# Patient Record
Sex: FEMALE | Race: BLACK OR AFRICAN AMERICAN | NOT HISPANIC OR LATINO | Employment: UNEMPLOYED | URBAN - METROPOLITAN AREA
[De-identification: names, ages, dates, MRNs, and addresses within clinical notes are randomized per-mention and may not be internally consistent; named-entity substitution may affect disease eponyms.]

---

## 2023-02-09 ENCOUNTER — TELEPHONE (OUTPATIENT)
Dept: NEUROLOGY | Facility: CLINIC | Age: 28
End: 2023-02-09
Payer: MEDICAID

## 2023-02-09 DIAGNOSIS — F44.5 PSEUDOSEIZURES: ICD-10-CM

## 2023-02-09 DIAGNOSIS — R56.9 CONVULSIONS, UNSPECIFIED CONVULSION TYPE: ICD-10-CM

## 2023-02-09 DIAGNOSIS — R56.9 SEIZURES: Primary | ICD-10-CM

## 2023-02-09 NOTE — TELEPHONE ENCOUNTER
EMU scheduled 3/2/23, 1pm. Prefers Thursday and the later admit time since it takes 3 hours to get here. Aware she is required to have an adult accompany her for the duration including overnight. Aware she will be connected to lines to her head, chest, arm, and have an IV placed; will not be able to leave the room for the duration. Patient discloses she is a smoker and is aware there is absolutely no smoking, vaping, or chewing nicotine gum. She knows she can request a nicotine patch. Instructions thoroughly discussed, mailed via [employer] and sent in portal.

## 2023-03-02 ENCOUNTER — HOSPITAL ENCOUNTER (INPATIENT)
Facility: HOSPITAL | Age: 28
LOS: 2 days | Discharge: HOME | End: 2023-03-05
Attending: PSYCHIATRY & NEUROLOGY | Admitting: PSYCHIATRY & NEUROLOGY
Payer: MEDICAID

## 2023-03-02 DIAGNOSIS — F44.5 PSEUDOSEIZURES: ICD-10-CM

## 2023-03-02 DIAGNOSIS — F44.5 PSYCHOGENIC NONEPILEPTIC SEIZURE: Primary | ICD-10-CM

## 2023-03-02 DIAGNOSIS — R56.9 SEIZURES: ICD-10-CM

## 2023-03-02 DIAGNOSIS — R56.9 SEIZURE: ICD-10-CM

## 2023-03-02 DIAGNOSIS — R56.9 CONVULSIONS, UNSPECIFIED CONVULSION TYPE: ICD-10-CM

## 2023-03-02 PROBLEM — F41.9 ANXIETY: Status: ACTIVE | Noted: 2023-03-02

## 2023-03-02 LAB
ALBUMIN SERPL BCP-MCNC: 3.2 G/DL (ref 3.5–5.2)
ALP SERPL-CCNC: 78 U/L (ref 55–135)
ALT SERPL W/O P-5'-P-CCNC: 16 U/L (ref 10–44)
ANION GAP SERPL CALC-SCNC: 6 MMOL/L (ref 8–16)
AST SERPL-CCNC: 18 U/L (ref 10–40)
BASOPHILS # BLD AUTO: 0.02 K/UL (ref 0–0.2)
BASOPHILS NFR BLD: 0.2 % (ref 0–1.9)
BILIRUB SERPL-MCNC: 0.3 MG/DL (ref 0.1–1)
BUN SERPL-MCNC: 8 MG/DL (ref 6–20)
CALCIUM SERPL-MCNC: 9.2 MG/DL (ref 8.7–10.5)
CHLORIDE SERPL-SCNC: 108 MMOL/L (ref 95–110)
CO2 SERPL-SCNC: 25 MMOL/L (ref 23–29)
CREAT SERPL-MCNC: 0.8 MG/DL (ref 0.5–1.4)
DIFFERENTIAL METHOD: ABNORMAL
EOSINOPHIL # BLD AUTO: 0.1 K/UL (ref 0–0.5)
EOSINOPHIL NFR BLD: 1.3 % (ref 0–8)
ERYTHROCYTE [DISTWIDTH] IN BLOOD BY AUTOMATED COUNT: 16.6 % (ref 11.5–14.5)
EST. GFR  (NO RACE VARIABLE): >60 ML/MIN/1.73 M^2
GLUCOSE SERPL-MCNC: 78 MG/DL (ref 70–110)
HCT VFR BLD AUTO: 34.1 % (ref 37–48.5)
HGB BLD-MCNC: 11.2 G/DL (ref 12–16)
IMM GRANULOCYTES # BLD AUTO: 0.03 K/UL (ref 0–0.04)
IMM GRANULOCYTES NFR BLD AUTO: 0.3 % (ref 0–0.5)
LYMPHOCYTES # BLD AUTO: 2 K/UL (ref 1–4.8)
LYMPHOCYTES NFR BLD: 22.6 % (ref 18–48)
MCH RBC QN AUTO: 24.9 PG (ref 27–31)
MCHC RBC AUTO-ENTMCNC: 32.8 G/DL (ref 32–36)
MCV RBC AUTO: 76 FL (ref 82–98)
MONOCYTES # BLD AUTO: 1 K/UL (ref 0.3–1)
MONOCYTES NFR BLD: 11.4 % (ref 4–15)
NEUTROPHILS # BLD AUTO: 5.8 K/UL (ref 1.8–7.7)
NEUTROPHILS NFR BLD: 64.2 % (ref 38–73)
NRBC BLD-RTO: 0 /100 WBC
PLATELET # BLD AUTO: 277 K/UL (ref 150–450)
PMV BLD AUTO: 10.8 FL (ref 9.2–12.9)
POTASSIUM SERPL-SCNC: 3.8 MMOL/L (ref 3.5–5.1)
PROT SERPL-MCNC: 6.8 G/DL (ref 6–8.4)
RBC # BLD AUTO: 4.5 M/UL (ref 4–5.4)
SODIUM SERPL-SCNC: 139 MMOL/L (ref 136–145)
WBC # BLD AUTO: 9.01 K/UL (ref 3.9–12.7)

## 2023-03-02 PROCEDURE — 80183 DRUG SCRN QUANT OXCARBAZEPIN: CPT | Performed by: PHYSICIAN ASSISTANT

## 2023-03-02 PROCEDURE — 95714 VEEG EA 12-26 HR UNMNTR: CPT

## 2023-03-02 PROCEDURE — 85025 COMPLETE CBC W/AUTO DIFF WBC: CPT | Performed by: PHYSICIAN ASSISTANT

## 2023-03-02 PROCEDURE — 25000003 PHARM REV CODE 250: Performed by: PHYSICIAN ASSISTANT

## 2023-03-02 PROCEDURE — 95700 EEG CONT REC W/VID EEG TECH: CPT

## 2023-03-02 PROCEDURE — 99223 PR INITIAL HOSPITAL CARE,LEVL III: ICD-10-PCS | Mod: ,,, | Performed by: PHYSICIAN ASSISTANT

## 2023-03-02 PROCEDURE — 93005 ELECTROCARDIOGRAM TRACING: CPT

## 2023-03-02 PROCEDURE — 99223 1ST HOSP IP/OBS HIGH 75: CPT | Mod: ,,, | Performed by: PHYSICIAN ASSISTANT

## 2023-03-02 PROCEDURE — G0378 HOSPITAL OBSERVATION PER HR: HCPCS

## 2023-03-02 PROCEDURE — 80053 COMPREHEN METABOLIC PANEL: CPT | Performed by: PHYSICIAN ASSISTANT

## 2023-03-02 PROCEDURE — G0379 DIRECT REFER HOSPITAL OBSERV: HCPCS

## 2023-03-02 PROCEDURE — 36415 COLL VENOUS BLD VENIPUNCTURE: CPT | Performed by: PHYSICIAN ASSISTANT

## 2023-03-02 PROCEDURE — 95720 PR EEG, W/VIDEO, CONT RECORD, I&R, >12<26 HRS: ICD-10-PCS | Mod: ,,, | Performed by: PSYCHIATRY & NEUROLOGY

## 2023-03-02 PROCEDURE — 93010 EKG 12-LEAD: ICD-10-PCS | Mod: ,,, | Performed by: INTERNAL MEDICINE

## 2023-03-02 PROCEDURE — 95720 EEG PHY/QHP EA INCR W/VEEG: CPT | Mod: ,,, | Performed by: PSYCHIATRY & NEUROLOGY

## 2023-03-02 PROCEDURE — 93010 ELECTROCARDIOGRAM REPORT: CPT | Mod: ,,, | Performed by: INTERNAL MEDICINE

## 2023-03-02 RX ORDER — SERTRALINE HYDROCHLORIDE 50 MG/1
50 TABLET, FILM COATED ORAL DAILY
Status: DISCONTINUED | OUTPATIENT
Start: 2023-03-02 | End: 2023-03-05 | Stop reason: HOSPADM

## 2023-03-02 RX ORDER — OXCARBAZEPINE 300 MG/1
300 TABLET, FILM COATED ORAL 2 TIMES DAILY
Status: DISCONTINUED | OUTPATIENT
Start: 2023-03-02 | End: 2023-03-02

## 2023-03-02 RX ORDER — ONDANSETRON 8 MG/1
8 TABLET, ORALLY DISINTEGRATING ORAL EVERY 8 HOURS PRN
Status: DISCONTINUED | OUTPATIENT
Start: 2023-03-02 | End: 2023-03-05 | Stop reason: HOSPADM

## 2023-03-02 RX ORDER — OXCARBAZEPINE 300 MG/1
300 TABLET, FILM COATED ORAL 2 TIMES DAILY
Status: COMPLETED | OUTPATIENT
Start: 2023-03-02 | End: 2023-03-03

## 2023-03-02 RX ORDER — ACETAMINOPHEN 325 MG/1
650 TABLET ORAL EVERY 4 HOURS PRN
Status: DISCONTINUED | OUTPATIENT
Start: 2023-03-02 | End: 2023-03-05 | Stop reason: HOSPADM

## 2023-03-02 RX ORDER — DOCUSATE SODIUM 100 MG/1
100 CAPSULE, LIQUID FILLED ORAL 2 TIMES DAILY PRN
Status: DISCONTINUED | OUTPATIENT
Start: 2023-03-02 | End: 2023-03-05 | Stop reason: HOSPADM

## 2023-03-02 RX ORDER — SODIUM CHLORIDE 0.9 % (FLUSH) 0.9 %
10 SYRINGE (ML) INJECTION
Status: DISCONTINUED | OUTPATIENT
Start: 2023-03-02 | End: 2023-03-05 | Stop reason: HOSPADM

## 2023-03-02 RX ORDER — OXCARBAZEPINE 300 MG/1
300 TABLET, FILM COATED ORAL 2 TIMES DAILY
Status: ON HOLD | COMMUNITY
End: 2023-03-05 | Stop reason: HOSPADM

## 2023-03-02 RX ORDER — TRAZODONE HYDROCHLORIDE 50 MG/1
50 TABLET ORAL NIGHTLY
COMMUNITY

## 2023-03-02 RX ORDER — SERTRALINE HYDROCHLORIDE 50 MG/1
50 TABLET, FILM COATED ORAL DAILY
COMMUNITY

## 2023-03-02 RX ORDER — LORAZEPAM 2 MG/ML
2 INJECTION INTRAMUSCULAR EVERY 5 MIN PRN
Status: DISCONTINUED | OUTPATIENT
Start: 2023-03-02 | End: 2023-03-05 | Stop reason: HOSPADM

## 2023-03-02 RX ADMIN — OXCARBAZEPINE 300 MG: 300 TABLET, FILM COATED ORAL at 08:03

## 2023-03-02 RX ADMIN — SERTRALINE HYDROCHLORIDE 50 MG: 50 TABLET ORAL at 03:03
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
## 2023-03-02 NOTE — ASSESSMENT & PLAN NOTE
- Continue home Sertraline 50 mg daily  - Recently prescribed Trazodone 50 mg qHS PRN, has not started yet

## 2023-03-02 NOTE — ASSESSMENT & PLAN NOTE
27 year old woman with events of staring followed by convulsions since 2016 admitted to EMU for event capture and characterization. Currently maintained on Oxcarbazepine 300 mg/600 mg with continued events 2-3 times per month. Prior EEG and CTH normal.     - Continuous vEEG   - Decrease home Oxcarbazepine to 300 mg BID x2 doses, then hold  - OXC level drawn on admission   - Activation procedures per protocol - medication adjustments as above  - IV Ativan PRN for GTC greater than 3 min - please call epilepsy on call   - Seizure precautions, suction and oxygen at bedside  - Fall precautions, side rail padding in place  - Visi monitoring with continuous pulse oximetry   - Telemetry    Plan of care discussed in detail with patient and brother at bedside.

## 2023-03-02 NOTE — SUBJECTIVE & OBJECTIVE
Past Medical History:   Diagnosis Date    Seizures        No past surgical history on file.    Review of patient's allergies indicates:   Allergen Reactions    Tramadol Hives and Itching       No current facility-administered medications on file prior to encounter.     Current Outpatient Medications on File Prior to Encounter   Medication Sig    OXcarbazepine (TRILEPTAL) 300 MG Tab Take 150 mg by mouth 2 (two) times daily. Take 1 tablet in the morning/take two tablets in the evening    sertraline (ZOLOFT) 50 MG tablet Take 50 mg by mouth once daily.    naproxen (NAPROSYN) 375 MG tablet Take 1 tablet (375 mg total) by mouth 2 (two) times daily with meals.    ondansetron (ZOFRAN) 4 MG tablet Take 1 tablet (4 mg total) by mouth every 6 (six) hours.    traZODone (DESYREL) 50 MG tablet Take 50 mg by mouth every evening.     Continuous Infusions:    Family History    None       Tobacco Use    Smoking status: Never    Smokeless tobacco: Not on file   Substance and Sexual Activity    Alcohol use: No    Drug use: No    Sexual activity: Yes     Partners: Male     Birth control/protection: Condom     Review of Systems   Constitutional:  Negative for chills and fever.   Gastrointestinal:  Negative for nausea and vomiting.   Musculoskeletal:  Negative for gait problem.   Neurological:  Negative for facial asymmetry, speech difficulty and weakness.   All other systems reviewed and are negative.  Objective:     Vital Signs (Most Recent):    Vital Signs (24h Range):           There is no height or weight on file to calculate BMI.    Physical Exam  Vitals reviewed.   Constitutional:       General: She is not in acute distress.     Appearance: She is not diaphoretic.   HENT:      Head: Normocephalic and atraumatic.      Comments: EEG in place  Eyes:      General: No scleral icterus.     Extraocular Movements: Extraocular movements intact.      Conjunctiva/sclera: Conjunctivae normal.      Pupils: Pupils are equal, round, and reactive  to light.   Cardiovascular:      Rate and Rhythm: Normal rate.   Pulmonary:      Effort: Pulmonary effort is normal. No respiratory distress.   Abdominal:      Palpations: Abdomen is soft.      Tenderness: There is no abdominal tenderness.   Musculoskeletal:         General: No swelling, tenderness or deformity. Normal range of motion.      Cervical back: Normal range of motion and neck supple.   Skin:     General: Skin is warm and dry.   Neurological:      General: No focal deficit present.      Mental Status: She is alert and oriented to person, place, and time. Mental status is at baseline.      Gait: Gait is intact.   Psychiatric:         Mood and Affect: Mood normal.         Speech: Speech normal.         Behavior: Behavior normal.       NEUROLOGICAL EXAMINATION:     MENTAL STATUS   Oriented to person, place, and time.   Attention: normal. Concentration: normal.   Speech: speech is normal   Level of consciousness: alert    CRANIAL NERVES     CN III, IV, VI   Pupils are equal, round, and reactive to light.    CN VII   Facial expression full, symmetric.     CN VIII   CN VIII normal.     CN XI   CN XI normal.     MOTOR EXAM   Muscle bulk: normal  Overall muscle tone: normal    GAIT AND COORDINATION     Gait  Gait: normal    Significant Labs: All pertinent lab results from the past 24 hours have been reviewed.    Significant Studies: I have reviewed all pertinent imaging results/findings within the past 24 hours.
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
## 2023-03-03 LAB
AMPHET+METHAMPHET UR QL: NEGATIVE
B-HCG UR QL: NEGATIVE
BARBITURATES UR QL SCN>200 NG/ML: NEGATIVE
BENZODIAZ UR QL SCN>200 NG/ML: NEGATIVE
BZE UR QL SCN: NEGATIVE
CANNABINOIDS UR QL SCN: ABNORMAL
CREAT UR-MCNC: 70 MG/DL (ref 15–325)
ETHANOL UR-MCNC: <10 MG/DL
METHADONE UR QL SCN>300 NG/ML: NEGATIVE
OPIATES UR QL SCN: NEGATIVE
PCP UR QL SCN>25 NG/ML: NEGATIVE
TOXICOLOGY INFORMATION: ABNORMAL

## 2023-03-03 PROCEDURE — 99233 SBSQ HOSP IP/OBS HIGH 50: CPT | Mod: ,,, | Performed by: PHYSICIAN ASSISTANT

## 2023-03-03 PROCEDURE — 95714 VEEG EA 12-26 HR UNMNTR: CPT

## 2023-03-03 PROCEDURE — 11000001 HC ACUTE MED/SURG PRIVATE ROOM

## 2023-03-03 PROCEDURE — 99233 PR SUBSEQUENT HOSPITAL CARE,LEVL III: ICD-10-PCS | Mod: ,,, | Performed by: PHYSICIAN ASSISTANT

## 2023-03-03 PROCEDURE — 80307 DRUG TEST PRSMV CHEM ANLYZR: CPT | Performed by: PHYSICIAN ASSISTANT

## 2023-03-03 PROCEDURE — 25000003 PHARM REV CODE 250: Performed by: PHYSICIAN ASSISTANT

## 2023-03-03 PROCEDURE — 81025 URINE PREGNANCY TEST: CPT | Performed by: PHYSICIAN ASSISTANT

## 2023-03-03 RX ORDER — DIPHENHYDRAMINE HCL 50 MG
50 CAPSULE ORAL ONCE
Status: COMPLETED | OUTPATIENT
Start: 2023-03-04 | End: 2023-03-04

## 2023-03-03 RX ADMIN — OXCARBAZEPINE 300 MG: 300 TABLET, FILM COATED ORAL at 08:03

## 2023-03-03 RX ADMIN — ACETAMINOPHEN 650 MG: 325 TABLET ORAL at 09:03

## 2023-03-03 RX ADMIN — SERTRALINE HYDROCHLORIDE 50 MG: 50 TABLET ORAL at 08:03

## 2023-03-03 NOTE — PROGRESS NOTES
Nurses Note -- 4 Eyes      3/2/2023   8:02 PM      Skin assessed during: Admit      [x] No Pressure Injuries Present    []Prevention Measures Documented      [] Yes- Altered Skin Integrity Present or Discovered   [] LDA Added if Not in Epic (Describe Wound)   [] New Altered Skin Integrity was Present on Admit and Documented in LDA   [] Wound Image Taken    Wound Care Consulted? No    Attending Nurse:  Frances Calix RN     Second RN/Staff Member:  Kerrie HamiltonRN

## 2023-03-03 NOTE — HOSPITAL COURSE
"27 year old woman admitted to EMU for capture and characterization of events of staring followed by convulsions.  3/2>3/3: Oxcarbazepine decreased to 300 mg BID x2 doses, will hold beginning 3/3 pm. No events overnight, EEG normal.   3/3-3/4: one nonepileptic event overnight, patient reported this as a "minor" event.  Will continue to monitor off AEDs in order to record a full event.   3/4-3/5: EEG remains normal, no further events.  Discussed event with patient, who reported that her longer events are clinically the same as the event already recorded, just longer in length.  Discussed diagnosis of conversion disorder/nonepileptic spells with patient, who understood the diagnosis and had good insight about it.  Will discharge today and have patient see neuropsychology as an outpatient.   "

## 2023-03-03 NOTE — PLAN OF CARE
[facility] - Neuro[facility] (Hospital)  Initial Discharge Assessment       Primary Care Provider: Primary Doctor No    Admission Diagnosis: Convulsions, unspecified convulsion type [R56.9]    Admission Date: 3/2/2023  Expected Discharge Date:     Discharge Barriers Identified: None    Payor: [place] [employer] / Plan: [facility] / Product Type: Managed Medicaid /     Extended Emergency Contact Information  Primary Emergency Contact: Kristel Buckner  Address: [address] [place] [address]  Mobile Phone: [phone]  Relation: Mother  Secondary Emergency Contact: Marlena Rodriguez           [facility]  Mobile Phone: [phone]  Relation: Relative    Discharge Plan A: Home with family  Discharge Plan B: Home with family      [id number] - [place], [place] - [address]  [address] [place] [address]  Phone: [phone] Fax: [phone]       CM met with patient to obtain discharge planning assessment, friend at bedside.    Initial Assessment (most recent)       Adult Discharge Assessment - 03/03/23 1352          Discharge Assessment    Assessment Type Discharge Planning Assessment     Confirmed/corrected address, phone number and insurance Yes     Confirmed Demographics Correct on Facesheet     Source of Information patient     Communicated ARGENIS with patient/caregiver Yes     Reason For Admission monitoring     People in Home friend(s)     Do you expect to return to your current living situation? Yes     Do you have help at home or someone to help you manage your care at home? Yes     Who are your caregiver(s) and their phone number(s)? Kristel Buckner - mother [phone]     Prior to hospitilization cognitive status: Alert/Oriented     Current cognitive status: Alert/Oriented     Equipment Currently Used at Home none     Readmission within 30 days? No     Patient currently being followed by outpatient case  management? No     Do you currently have service(s) that help you manage your care at home? No     Do you take prescription medications? Yes     Do you have prescription coverage? Yes     Coverage [place] - [facility]     Do you have any problems affording any of your prescribed medications? No     Is the patient taking medications as prescribed? yes     Who is going to help you get home at discharge? family/friend     How do you get to doctors appointments? family or friend will provide     Are you on dialysis? No     Do you take coumadin? No     Discharge Plan A Home with family     Discharge Plan B Home with family     DME Needed Upon Discharge  none     Discharge Plan discussed with: Patient     Discharge Barriers Identified None        OTHER    Name(s) of People in Home friend

## 2023-03-03 NOTE — PROGRESS NOTES
[facility] - EMU  Neurology-Epilepsy  Progress Note    Patient Name: Lindsay Hamilton  MRN: [id number]  Admission Date: 3/2/2023  Hospital Length of Stay: 0 days  Code Status: Full Code   Attending Provider: Chandana Kennedy MD  Primary Care Physician: Primary Doctor No   Principal Problem:Seizures    Subjective:     Hospital Course:   27 year old woman admitted to EMU for capture and characterization of events of staring followed by convulsions.  3/2>3/3: Oxcarbazepine decreased to 300 mg BID x2 doses, will hold beginning 3/3 pm. No events overnight, EEG normal.       Interval History: No typical events captured since admission. Slept well overnight. Hold home Oxcarbazepine beginning this evening.     Current Facility-Administered Medications   Medication Dose Route Frequency Provider Last Rate Last Admin    acetaminophen tablet 650 mg  650 mg Oral Q4H PRN Concha Fine, PA-C        docusate sodium capsule 100 mg  100 mg Oral BID PRN Concha Fine, PA-C        LORazepam injection 2 mg  2 mg Intravenous Q5 Min PRN Concha Fine, PA-C        ondansetron disintegrating tablet 8 mg  8 mg Oral Q8H PRN Concha Fine, PA-C        sertraline tablet 50 mg  50 mg Oral Daily Concha Fine, PA-C   50 mg at 03/03/23 0848    sodium chloride 0.9% flush 10 mL  10 mL Intravenous PRN Concha Fine, PA-C         Continuous Infusions:    Review of Systems   Constitutional:  Negative for chills and fever.   Gastrointestinal:  Negative for nausea and vomiting.   Musculoskeletal:  Negative for gait problem.   Neurological:  Negative for facial asymmetry, speech difficulty and weakness.   All other systems reviewed and are negative.  Objective:     Vital Signs (Most Recent):  Temp: 98.2 °F (36.8 °C) (03/03/23 1144)  Pulse: 63 (03/03/23 1144)  Resp: 17 (03/03/23 1144)  BP: 117/67 (03/03/23 1144)  SpO2: 97 % (03/03/23 1144) Vital Signs (24h Range):  Temp:  [97.2 °F (36.2 °C)-98.3 °F (36.8 °C)] 98.2 °F (36.8 °C)  Pulse:  [50-71] 63  Resp:  [16-20]  17  SpO2:  [94 %-100 %] 97 %  BP: ()/(51-67) 117/67     Weight: 118.6 kg (261 lb 7.5 oz)  Body mass index is 43.51 kg/m².    Physical Exam  Vitals reviewed.   Constitutional:       General: She is not in acute distress.     Appearance: She is not diaphoretic.   HENT:      Head: Normocephalic and atraumatic.      Comments: EEG in place  Eyes:      General: No scleral icterus.     Extraocular Movements: Extraocular movements intact.      Conjunctiva/sclera: Conjunctivae normal.      Pupils: Pupils are equal, round, and reactive to light.   Cardiovascular:      Rate and Rhythm: Normal rate.   Pulmonary:      Effort: Pulmonary effort is normal. No respiratory distress.   Abdominal:      Palpations: Abdomen is soft.      Tenderness: There is no abdominal tenderness.   Musculoskeletal:         General: No swelling, tenderness or deformity. Normal range of motion.      Cervical back: Normal range of motion and neck supple.   Skin:     General: Skin is warm and dry.   Neurological:      General: No focal deficit present.      Mental Status: She is alert and oriented to person, place, and time. Mental status is at baseline.      Coordination: Finger-Nose-Finger Test normal.   Psychiatric:         Mood and Affect: Mood normal.         Speech: Speech normal.         Behavior: Behavior normal.       NEUROLOGICAL EXAMINATION:     MENTAL STATUS   Oriented to person, place, and time.   Attention: normal. Concentration: normal.   Speech: speech is normal   Level of consciousness: alert    CRANIAL NERVES     CN III, IV, VI   Pupils are equal, round, and reactive to light.    CN VII   Facial expression full, symmetric.     CN VIII   CN VIII normal.     CN XI   CN XI normal.     CN XII   CN XII normal.     MOTOR EXAM   Muscle bulk: normal  Overall muscle tone: normal    GAIT AND COORDINATION      Coordination   Finger to nose coordination: normal    Significant Labs: All pertinent lab results from the past 24 hours have been  reviewed.    Significant Studies: I have reviewed all pertinent imaging results/findings within the past 24 hours.    Assessment and Plan:     * Seizures  27 year old woman with events of staring followed by convulsions since 2016 admitted to EMU for event capture and characterization. Currently maintained on Oxcarbazepine 300 mg/600 mg with continued events 2-3 times per month. Prior EEG and CTH normal.     - Continuous vEEG - no events since admission, EEG normal  - Decreased home Oxcarbazepine to 300 mg BID x2 doses, then hold beginning 3/3 pm  - OXC level drawn on admission   - Activation procedures per protocol - medication adjustments as above, sleep deprivation tonight with provoking medications 3/4 am  - IV Ativan PRN for GTC greater than 3 min - please call epilepsy on call   - Seizure precautions, suction and oxygen at bedside  - Fall precautions, side rail padding in place  - Visi monitoring with continuous pulse oximetry   - Telemetry    Plan of care discussed in detail with patient and brother at bedside.    Anxiety  - Continue home Sertraline 50 mg daily  - Recently prescribed Trazodone 50 mg qHS PRN, has not started yet        VTE Risk Mitigation (From admission, onward)         Ordered     Place TORO hose  Until discontinued         03/02/23 1411     Place sequential compression device  Until discontinued         03/02/23 1411     IP VTE LOW RISK PATIENT  Once         03/02/23 1411                Concha Lucas PA-C  Neurology-Epilepsy  [facility] - EMU  Staff: Dr. Kennedy

## 2023-03-03 NOTE — ASSESSMENT & PLAN NOTE
27 year old woman with events of staring followed by convulsions since 2016 admitted to EMU for event capture and characterization. Currently maintained on Oxcarbazepine 300 mg/600 mg with continued events 2-3 times per month. Prior EEG and CTH normal.     - Continuous vEEG - no events since admission, EEG normal  - Decreased home Oxcarbazepine to 300 mg BID x2 doses, then hold beginning 3/3 pm  - OXC level drawn on admission   - Activation procedures per protocol - medication adjustments as above, sleep deprivation tonight with provoking medications 3/4 am  - IV Ativan PRN for GTC greater than 3 min - please call epilepsy on call   - Seizure precautions, suction and oxygen at bedside  - Fall precautions, side rail padding in place  - Visi monitoring with continuous pulse oximetry   - Telemetry    Plan of care discussed in detail with patient and brother at bedside.

## 2023-03-03 NOTE — NURSING
Was called by PCT was told patient was having seizure, on arrival nurses at bed side  patient was back to baseline .was told by nurse that patient had a seizure event described as staring off to the left side and unresponsive to questions and prompts, lasted about 60 seconds. Patient back at baseline and AAOx4, following commands. V/S  /57, O2 100% on room air pulse 76, resp 18

## 2023-03-03 NOTE — PLAN OF CARE
Problem: Adult Inpatient Plan of Care  Goal: Optimal Comfort and Wellbeing  Outcome: Ongoing, Progressing     Problem: Adult Inpatient Plan of Care  Goal: Readiness for Transition of Care  Outcome: Ongoing, Progressing     POC reviewed with patient and her brother at the beside. Verbalization of understanding voiced. Questions and concerns addressed. Precautions maintained. No events noted at present during this shift. Patient progressing towards goals. Cardiac monitoring all shift. See flow sheet. Patient noted to have soft blood pressures, patient states her blood pressure does run low. No signs of distress or discomfort noted. EEG remains in progress. Bed low and locked with call light and bed alarm activated. Patients' brother remains at the beside. POC ongoing.

## 2023-03-03 NOTE — SUBJECTIVE & OBJECTIVE
Interval History: No typical events captured since admission. Slept well overnight. Hold home Oxcarbazepine beginning this evening.     Current Facility-Administered Medications   Medication Dose Route Frequency Provider Last Rate Last Admin    acetaminophen tablet 650 mg  650 mg Oral Q4H PRN Concha Lucas PA-C        docusate sodium capsule 100 mg  100 mg Oral BID PRN Concha Lucas PA-C        LORazepam injection 2 mg  2 mg Intravenous Q5 Min PRN Concha Lucas PA-C        ondansetron disintegrating tablet 8 mg  8 mg Oral Q8H PRN TIMBO Armando-VALERIE        sertraline tablet 50 mg  50 mg Oral Daily Concha Lucas PA-C   50 mg at 03/03/23 0848    sodium chloride 0.9% flush 10 mL  10 mL Intravenous PRN Concha Lucas PA-C         Continuous Infusions:    Review of Systems   Constitutional:  Negative for chills and fever.   Gastrointestinal:  Negative for nausea and vomiting.   Musculoskeletal:  Negative for gait problem.   Neurological:  Negative for facial asymmetry, speech difficulty and weakness.   All other systems reviewed and are negative.  Objective:     Vital Signs (Most Recent):  Temp: 98.2 °F (36.8 °C) (03/03/23 1144)  Pulse: 63 (03/03/23 1144)  Resp: 17 (03/03/23 1144)  BP: 117/67 (03/03/23 1144)  SpO2: 97 % (03/03/23 1144) Vital Signs (24h Range):  Temp:  [97.2 °F (36.2 °C)-98.3 °F (36.8 °C)] 98.2 °F (36.8 °C)  Pulse:  [50-71] 63  Resp:  [16-20] 17  SpO2:  [94 %-100 %] 97 %  BP: ()/(51-67) 117/67     Weight: 118.6 kg (261 lb 7.5 oz)  Body mass index is 43.51 kg/m².    Physical Exam  Vitals reviewed.   Constitutional:       General: She is not in acute distress.     Appearance: She is not diaphoretic.   HENT:      Head: Normocephalic and atraumatic.      Comments: EEG in place  Eyes:      General: No scleral icterus.     Extraocular Movements: Extraocular movements intact.      Conjunctiva/sclera: Conjunctivae normal.      Pupils: Pupils are equal, round, and reactive to light.   Cardiovascular:      Rate and  Rhythm: Normal rate.   Pulmonary:      Effort: Pulmonary effort is normal. No respiratory distress.   Abdominal:      Palpations: Abdomen is soft.      Tenderness: There is no abdominal tenderness.   Musculoskeletal:         General: No swelling, tenderness or deformity. Normal range of motion.      Cervical back: Normal range of motion and neck supple.   Skin:     General: Skin is warm and dry.   Neurological:      General: No focal deficit present.      Mental Status: She is alert and oriented to person, place, and time. Mental status is at baseline.      Coordination: Finger-Nose-Finger Test normal.   Psychiatric:         Mood and Affect: Mood normal.         Speech: Speech normal.         Behavior: Behavior normal.       NEUROLOGICAL EXAMINATION:     MENTAL STATUS   Oriented to person, place, and time.   Attention: normal. Concentration: normal.   Speech: speech is normal   Level of consciousness: alert    CRANIAL NERVES     CN III, IV, VI   Pupils are equal, round, and reactive to light.    CN VII   Facial expression full, symmetric.     CN VIII   CN VIII normal.     CN XI   CN XI normal.     CN XII   CN XII normal.     MOTOR EXAM   Muscle bulk: normal  Overall muscle tone: normal    GAIT AND COORDINATION      Coordination   Finger to nose coordination: normal    Significant Labs: All pertinent lab results from the past 24 hours have been reviewed.    Significant Studies: I have reviewed all pertinent imaging results/findings within the past 24 hours.

## 2023-03-03 NOTE — PROCEDURES
EEG REPORT      Lindsay Hamilton  [id number]  1995    DATE OF SERVICE: 3/2/2023    [facility] -1    METHODOLOGY      Extended electroencephalographic recording is made while the patient is ambulatory and continuing normal daily activities.  Electrodes are placed according to the International 10-20 placement system and included T1 and T2 electrode placement.  Twenty four (24) channels of digital signal (sampling rate of 512/sec) was simultaneously recorded from the scalp including EKG and eye monitors.  Recording band pass was 0.1 to 100 hz and all data was stored digitally on the recorder.  The patient is instructed to press an event button when clinical symptoms occur and write the symptoms into a diary. Activation procedures which include photic stimulation, hyperventilation and instructing patients to perform simple task are done in selected patients.        The EEG is displayed on a monitor screen and can be reformatted into different montages for evaluation.  The entire recoding is submitted for computer assisted analysis to detect spike and electrographic seizure activity.  The entire recording is visually reviewed and the times identified by computer analysis as being spikes or seizures are reviewed again.  Compresses spectral analysis (CSA) is also performed on the activity recorded from each individual channel.  This is displayed as a power display of frequencies from 0 to 30 Hz over time.   The CSA analysis is done and displayed continuously.  This is reviewed for asymmetries in power between homologous areas of the scalp and for presence of changes in power which canbe seen when seizures occur.  Sections of suspected abnormalities on the CSA is then compared with the original EEG recording.  .     MES[employer] software was also utilized in the review of this study.  This software suite analyzes the EEG recording in multiple domains.  Coherence and rhythmicity is computed to identify EEG sections which may  contain organized seizures.  Each channel undergoes analysis to detect presence of spike and sharp waves which have special and morphological characteristic of epileptic activity.  The routine EEG recording is converted from spacial into frequency domain.  This is then displayed comparing homologous areas to identify areas of significant asymmetry.  Algorithm to identify non-cortically generated artifact is used to separate eye movement, EMG and other artifact from the EEG     Recording Times  Start on 3/2/2023  Stop on 3/3/2023    A total of 14:33:04 hours of EEG was recorded.      EEG FINDINGS:  Background activity:   The background rhythm was characterized by alpha and anterior dominant beta activity with a 10Hz posterior dominant alpha rhythm at 30-70 microvolts.   Symmetry and continuity: the background was continuous and symmetric     Sleep:   Normal sleep transients including sleep spindles, K complexes, vertex waves and POSTS were seen.    Activation procedures:   NA    Abnormal activity:   No epileptiform discharges, periodic discharges, lateralized rhythmic delta activity or electrographic seizures were seen.    IMPRESSION:   Normal one day video EEG      Chandana Kennedy MD  Neurology-Epilepsy.  [facility].

## 2023-03-04 LAB — OXCARBAZEPINE METABOLITE: <1 MCG/ML (ref 10–35)

## 2023-03-04 PROCEDURE — 99233 PR SUBSEQUENT HOSPITAL CARE,LEVL III: ICD-10-PCS | Mod: GT,,, | Performed by: STUDENT IN AN ORGANIZED HEALTH CARE EDUCATION/TRAINING PROGRAM

## 2023-03-04 PROCEDURE — 95720 EEG PHY/QHP EA INCR W/VEEG: CPT | Mod: ,,, | Performed by: STUDENT IN AN ORGANIZED HEALTH CARE EDUCATION/TRAINING PROGRAM

## 2023-03-04 PROCEDURE — 11000001 HC ACUTE MED/SURG PRIVATE ROOM

## 2023-03-04 PROCEDURE — 25000003 PHARM REV CODE 250: Performed by: STUDENT IN AN ORGANIZED HEALTH CARE EDUCATION/TRAINING PROGRAM

## 2023-03-04 PROCEDURE — 95714 VEEG EA 12-26 HR UNMNTR: CPT

## 2023-03-04 PROCEDURE — 94761 N-INVAS EAR/PLS OXIMETRY MLT: CPT

## 2023-03-04 PROCEDURE — 95720 PR EEG, W/VIDEO, CONT RECORD, I&R, >12<26 HRS: ICD-10-PCS | Mod: ,,, | Performed by: STUDENT IN AN ORGANIZED HEALTH CARE EDUCATION/TRAINING PROGRAM

## 2023-03-04 PROCEDURE — 25000003 PHARM REV CODE 250: Performed by: PHYSICIAN ASSISTANT

## 2023-03-04 PROCEDURE — 99233 SBSQ HOSP IP/OBS HIGH 50: CPT | Mod: GT,,, | Performed by: STUDENT IN AN ORGANIZED HEALTH CARE EDUCATION/TRAINING PROGRAM

## 2023-03-04 RX ADMIN — SERTRALINE HYDROCHLORIDE 50 MG: 50 TABLET ORAL at 08:03

## 2023-03-04 RX ADMIN — BENZOCAINE: 0.1 GEL TOPICAL at 09:03

## 2023-03-04 RX ADMIN — DIPHENHYDRAMINE HYDROCHLORIDE 50 MG: 50 CAPSULE ORAL at 07:03

## 2023-03-04 NOTE — PROCEDURES
VIDEO ELECTROENCEPHALOGRAM  REPORT      METHODOLOGY   Electroencephalographic (EEG) recording is with electrodes placed according to the International 10-20 placement system.  Thirty two (32) channels of digital signal (sampling rate of 512/sec) including T1 and T2 was simultaneously recorded from the scalp and may include  EKG, EMG, and/or eye monitors.  Recording band pass was 0.1 to 512 hz.  Digital video recording of the patient is simultaneously recorded with the EEG.  The patient is instructed report clinical symptoms which may occur during the recording session.  EEG and video recording is stored and archived in digital format.  Activation procedures which include photic stimulation, hyperventilation and instructing patients to perform simple task are done in selected patients.   The EEG is displayed on a monitor screen and can be reviewed using different montages.  Computer assisted analysis is employed to detect spike and electrographic seizure activity.   The entire record is submitted for computer analysis.  The entire recording is visually reviewed and the times identified by computer analysis as being spikes or seizures are reviewed again.  Compresses spectral analysis (CSA) is also performed on the activity recorded from each individual channel.  This is displayed as a power display of frequencies from 0 to 30 Hz over time.   The CSA is reviewed looking for asymmetries in power between homologous areas of the scalp and then compared with the original EEG recording.     [employer] software was also utilized in the review of this study.  This software suite analyzes the EEG recording in multiple domains.  Coherence and rhythmicity is computed to identify EEG sections which may contain organized seizures.  Each channel undergoes analysis to detect presence of spike and sharp waves which have special and morphological characteristic of epileptic activity.  The routine EEG recording is converted from spacial  into frequency domain.  This is then displayed comparing homologous areas to identify areas of significant asymmetry.  Algorithm to identify non-cortically generated artifact is used to separate eye movement, EMG and other artifact from the EEG.      ELECTROENCEPHALOGRAM:    DAY 2:  RECORDING TIMES:    A total of 23 hrs and 59 min of EEG recording was obtained.    Indication: 27 year old female with seizure like events admitted for spell characterization.  As an outpatient she is on oxcarbazepine, which has been held.     State of Consciousness:   Awake and asleep    Background:   The background is well organized, symmetric and continuous.  There is a normal anterior to posterior gradient consisting of 5-10 mcV amplitude beta activity in the frontal region and well defined alpha activity in the posterior region.  . There is a well developed 30-70 uV, 9-10 Hz posterior dominant rhythm that is symmetric and reactive to eye opening and closure     Sleep:   The patient reaches stage 2 sleep with symmetric vertex waves, sleep spindles, and k complexes noted.     Non-epileptiform Abnormalities:  None    Epileptiform Abnormalities:   None      EKG:   Regular rate and rhythm    Activating procedures:   - not performed    Events:   Nonepileptic event #1:   Clinical event description: patient pulls head back and has some back arching movements and non responsiveness.  She also has rapid eyelid fluttering  EEG: no epileptiform discharges and background is normal      Impression:   Normal awake and sleep EEG.    Clinical interpretation:  This is a normal awake and sleep EEG.  One event was recorded consisting of back arching and rapid eye fluttering with decreased responsiveness which was nonepileptic.     Marialuisa Heath MD  [facility]   Department of Neurology

## 2023-03-04 NOTE — PLAN OF CARE
Problem: Adult Inpatient Plan of Care  Goal: Plan of Care Review  Outcome: Ongoing, Progressing  Goal: Patient-Specific Goal (Individualized)  Outcome: Ongoing, Progressing  Goal: Absence of Hospital-Acquired Illness or Injury  Outcome: Ongoing, Progressing  Goal: Optimal Comfort and Wellbeing  Outcome: Ongoing, Progressing  Goal: Readiness for Transition of Care  Outcome: Ongoing, Progressing     Problem: Bariatric Environmental Safety  Goal: Safety Maintained with Care  Outcome: Ongoing, Progressing   POC reviewed and updated with the patient. Questions regarding POC were encouraged and addressed with the patient.  VSS, see flow-sheets. Tele maintained. Patient is AO X 4 at this time. Fall/safety precautions maintained, no signs of injury noted during shift. Seizure precautions maintained, no events noted during shift. Patient was repositioned for comfort, bed locked in low position with side rails X 4, bed alarm set, with call light within reach. Instructed patient to call staff for mobility, verbalized understanding.  No acute signs of distress noted at this time.     Successful sleep deprivation per provider order. Patient may sleep from 0300 to 0700, then patient to remain awake. VS obtained. Interruptions minimized to promote sleep. No acute signs of distress noted at this time.

## 2023-03-04 NOTE — SUBJECTIVE & OBJECTIVE
Interval History: No events captured.  Patient doing alright this morning, had mild headache but improved.      Current Facility-Administered Medications   Medication Dose Route Frequency Provider Last Rate Last Admin    acetaminophen tablet 650 mg  650 mg Oral Q4H PRN Concha Fine, PA-C   650 mg at 03/03/23 2132    benzocaine 10 % mucosal gel   Mouth/Throat PRN Nando Bales MD        docusate sodium capsule 100 mg  100 mg Oral BID PRN Concah Fine, PA-C        LORazepam injection 2 mg  2 mg Intravenous Q5 Min PRN Concha Fine, PA-C        ondansetron disintegrating tablet 8 mg  8 mg Oral Q8H PRN Concha Fine, PA-C        sertraline tablet 50 mg  50 mg Oral Daily Concha Fine, PA-C   50 mg at 03/04/23 0848    sodium chloride 0.9% flush 10 mL  10 mL Intravenous PRN Concha Fine, PA-C         Continuous Infusions:    Review of Systems   Constitutional: Negative.    HENT: Negative.     Eyes: Negative.    Respiratory: Negative.     Cardiovascular: Negative.    Gastrointestinal: Negative.    Endocrine: Negative.    Musculoskeletal: Negative.    Skin: Negative.    Neurological:  Positive for headaches.   Objective:     Vital Signs (Most Recent):  Temp: 96.7 °F (35.9 °C) (03/04/23 0753)  Pulse: (!) 56 (03/04/23 1055)  Resp: 16 (03/04/23 0753)  BP: (!) 118/58 (03/04/23 0753)  SpO2: 99 % (03/04/23 0753)   Vital Signs (24h Range):  Temp:  [96.7 °F (35.9 °C)-98.9 °F (37.2 °C)] 96.7 °F (35.9 °C)  Pulse:  [49-74] 56  Resp:  [16-18] 16  SpO2:  [97 %-99 %] 99 %  BP: (111-118)/(53-67) 118/58     Weight: 118.6 kg (261 lb 7.5 oz)  Body mass index is 43.51 kg/m².    Physical Exam  Constitutional:       Appearance: Normal appearance.   HENT:      Head: Normocephalic and atraumatic.      Mouth/Throat:      Mouth: Mucous membranes are moist.   Eyes:      Extraocular Movements: Extraocular movements intact and EOM normal.      Pupils: Pupils are equal, round, and reactive to light.   Cardiovascular:      Rate and Rhythm: Normal rate.    Pulmonary:      Effort: Pulmonary effort is normal.   Abdominal:      General: Abdomen is flat.   Musculoskeletal:      Cervical back: Neck supple.   Skin:     General: Skin is warm and dry.   Neurological:      Mental Status: She is alert and oriented to person, place, and time.       NEUROLOGICAL EXAMINATION:     MENTAL STATUS   Oriented to person, place, and time.   Attention: normal. Concentration: normal.   Level of consciousness: alert    CRANIAL NERVES     CN III, IV, VI   Pupils are equal, round, and reactive to light.  Extraocular motions are normal.     CN VII   Facial expression full, symmetric.     CN VIII   CN VIII normal.     MOTOR EXAM     Strength   Left strength: Moves all extremities spontaneously and symmetrically    SENSORY EXAM   Light touch normal.     Significant Labs: All pertinent lab results from the past 24 hours have been reviewed.    Significant Studies: I have reviewed and interpreted all pertinent imaging results/findings within the past 24 hours.

## 2023-03-04 NOTE — PLAN OF CARE
Problem: Adult Inpatient Plan of Care  Goal: Plan of Care Review  Outcome: Ongoing, Progressing  Goal: Readiness for Transition of Care  Outcome: Ongoing, Progressing     Problem: Bariatric Environmental Safety  Goal: Safety Maintained with Care  Outcome: Ongoing, Progressing     Problem: Seizure, Active Management  Goal: Absence of Seizure/Seizure-Related Injury  Outcome: Ongoing, Progressing     Poc reviewed with patient. NAEO. Successfully sleep deprived through day. Safety measures maintained. Patient bed in low position. Bed alarm set. Patient call bell with in reach. Patient belongings with in reach. VSS. Full assessment in chart. NAEO.

## 2023-03-04 NOTE — ASSESSMENT & PLAN NOTE
27 year old woman with events of staring followed by convulsions since 2016 admitted to EMU for event capture and characterization. Currently maintained on Oxcarbazepine 300 mg/600 mg with continued events 2-3 times per month. Prior EEG and CTH normal.     - Continuous vEEG - no events since admission, EEG normal  - Decreased home Oxcarbazepine to 300 mg BID x2 doses, then hold beginning 3/3 pm  - OXC level drawn on admission   - Activation procedures per protocol - medication adjustments as above, sleep deprivation tonight with provoking medications 3/4 am  - 3/4: continue to monitor off AEDs  - IV Ativan PRN for GTC greater than 3 min - please call epilepsy on call   - Seizure precautions, suction and oxygen at bedside  - Fall precautions, side rail padding in place  - Visi monitoring with continuous pulse oximetry   - Telemetry    Plan of care discussed in detail with patient and brother at bedside.

## 2023-03-04 NOTE — PROGRESS NOTES
"[facility] ([facility])  Neurology-Epilepsy  Progress Note    Patient Name: Lindsay Hamilton  MRN: [id number]  Admission Date: 3/2/2023  Hospital Length of Stay: 1 days  Code Status: Full Code   Attending Provider: Chandana Kennedy MD  Primary Care Physician: Primary Doctor No   Principal Problem:Seizures    Subjective:     Hospital Course:   27 year old woman admitted to EMU for capture and characterization of events of staring followed by convulsions.  3/2>3/3: Oxcarbazepine decreased to 300 mg BID x2 doses, will hold beginning 3/3 pm. No events overnight, EEG normal.   3/3-3/4: one nonepileptic event overnight, patient reported this as a "minor" event.  Will continue to monitor off AEDs in order to record a full event.       Interval history: one event captured, patient reported it as a minor event.   Assessment and Plan:     * Seizures  27 year old woman with events of staring followed by convulsions since 2016 admitted to EMU for event capture and characterization. Currently maintained on Oxcarbazepine 300 mg/600 mg with continued events 2-3 times per month. Prior EEG and CTH normal.     - Continuous vEEG - no events since admission, EEG normal  - Decreased home Oxcarbazepine to 300 mg BID x2 doses, then hold beginning 3/3 pm  - OXC level drawn on admission   - Activation procedures per protocol - medication adjustments as above, sleep deprivation tonight with provoking medications 3/4 am  - 3/4: continue to monitor off AEDs  - IV Ativan PRN for GTC greater than 3 min - please call epilepsy on call   - Seizure precautions, suction and oxygen at bedside  - Fall precautions, side rail padding in place  - Visi monitoring with continuous pulse oximetry   - Telemetry    Plan of care discussed in detail with patient and brother at bedside.    Anxiety  - Continue home Sertraline 50 mg daily  - Recently prescribed Trazodone 50 mg qHS PRN, has not started yet        VTE Risk Mitigation (From admission, onward) "           Ordered     Place TORO hose  Until discontinued         03/02/23 1411     Place sequential compression device  Until discontinued         03/02/23 1411     IP VTE LOW RISK PATIENT  Once         03/02/23 1411                    Marialuisa Heath MD  [facility]   Department of Neurology

## 2023-03-05 VITALS
RESPIRATION RATE: 18 BRPM | TEMPERATURE: 99 F | HEART RATE: 62 BPM | HEIGHT: 65 IN | SYSTOLIC BLOOD PRESSURE: 122 MMHG | DIASTOLIC BLOOD PRESSURE: 76 MMHG | OXYGEN SATURATION: 98 % | WEIGHT: 261.44 LBS | BODY MASS INDEX: 43.56 KG/M2

## 2023-03-05 PROBLEM — F44.5 PSYCHOGENIC NONEPILEPTIC SEIZURE: Status: ACTIVE | Noted: 2023-03-02

## 2023-03-05 PROCEDURE — 25000003 PHARM REV CODE 250: Performed by: PHYSICIAN ASSISTANT

## 2023-03-05 PROCEDURE — 99239 HOSP IP/OBS DSCHRG MGMT >30: CPT | Mod: ,,, | Performed by: STUDENT IN AN ORGANIZED HEALTH CARE EDUCATION/TRAINING PROGRAM

## 2023-03-05 PROCEDURE — 94761 N-INVAS EAR/PLS OXIMETRY MLT: CPT

## 2023-03-05 PROCEDURE — 99239 PR HOSPITAL DISCHARGE DAY,>30 MIN: ICD-10-PCS | Mod: ,,, | Performed by: STUDENT IN AN ORGANIZED HEALTH CARE EDUCATION/TRAINING PROGRAM

## 2023-03-05 RX ADMIN — SERTRALINE HYDROCHLORIDE 50 MG: 50 TABLET ORAL at 08:03
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
## 2023-03-06 ENCOUNTER — TELEPHONE (OUTPATIENT)
Dept: NEUROLOGY | Facility: CLINIC | Age: 28
End: 2023-03-06
Payer: MEDICAID

## 2023-03-06 NOTE — TELEPHONE ENCOUNTER
[facility] Notes & labs 3/2/23- 3/5/23 faxed to [facility], Attn: Dr. LAWANDA Quiles, [phone]    Your fax has been successfully sent to [phone] at [phone].  ------------------------------------------------------------  From: [id number]  ------------------------------------------------------------  3/6/2023 9:17:38 AM Transmission Record   Sent to [phone] with remote ID "   Result: (0/339;0/0) Success   Page record: 1 - 28   Elapsed time: 09:18 on channel 50

## 2023-03-06 NOTE — PROCEDURES
VIDEO ELECTROENCEPHALOGRAM  REPORT      METHODOLOGY   Electroencephalographic (EEG) recording is with electrodes placed according to the International 10-20 placement system.  Thirty two (32) channels of digital signal (sampling rate of 512/sec) including T1 and T2 was simultaneously recorded from the scalp and may include  EKG, EMG, and/or eye monitors.  Recording band pass was 0.1 to 512 hz.  Digital video recording of the patient is simultaneously recorded with the EEG.  The patient is instructed report clinical symptoms which may occur during the recording session.  EEG and video recording is stored and archived in digital format.  Activation procedures which include photic stimulation, hyperventilation and instructing patients to perform simple task are done in selected patients.   The EEG is displayed on a monitor screen and can be reviewed using different montages.  Computer assisted analysis is employed to detect spike and electrographic seizure activity.   The entire record is submitted for computer analysis.  The entire recording is visually reviewed and the times identified by computer analysis as being spikes or seizures are reviewed again.  Compresses spectral analysis (CSA) is also performed on the activity recorded from each individual channel.  This is displayed as a power display of frequencies from 0 to 30 Hz over time.   The CSA is reviewed looking for asymmetries in power between homologous areas of the scalp and then compared with the original EEG recording.     [employer] [facility] [employer] software was also utilized in the review of this study.  This software suite analyzes the EEG recording in multiple domains.  Coherence and rhythmicity is computed to identify EEG sections which may contain organized seizures.  Each channel undergoes analysis to detect presence of spike and sharp waves which have special and morphological characteristic of epileptic activity.  The routine EEG recording is converted from spacial  into frequency domain.  This is then displayed comparing homologous areas to identify areas of significant asymmetry.  Algorithm to identify non-cortically generated artifact is used to separate eye movement, EMG and other artifact from the EEG.      ELECTROENCEPHALOGRAM:    DAY 3:  RECORDING TIMES:  3/4/23 at 07:00:01  3/5/23 at 07:00:00    A total of 24 hours of EEG recording was obtained.    Indication: 27 year old female with seizure like events admitted for spell characterization.  As an outpatient she is on oxcarbazepine, which has been held.     State of Consciousness:   Awake and asleep    Background:   The background is well organized, symmetric and continuous.  There is a normal anterior to posterior gradient consisting of 5-10 mcV amplitude beta activity in the frontal region and well defined alpha activity in the posterior region.   There is a well developed 30-70 uV, 9-10 Hz posterior dominant rhythm that is symmetric and reactive to eye opening and closure     Sleep:   The patient reaches stage 2 sleep with symmetric vertex waves, sleep spindles, and k complexes noted.     Background showing normal sleep      Non-epileptiform Abnormalities:  None    Epileptiform Abnormalities:   None      EKG:   Regular rate and rhythm    Activating procedures:   - not performed    Events:   None      Impression:   Normal awake and sleep EEG.    Clinical interpretation:  This is a normal awake and sleep EEG.  No clinical events are recorded.  No epileptiform discharges or lateralizing features are noted.     Marialuisa Heath MD  [facility]   Department of Neurology

## 2023-03-06 NOTE — PLAN OF CARE
[facility] - Neurosurgery (Hospital)  Discharge Final Note    Primary Care Provider: Primary Doctor No    Expected Discharge Date: 3/5/2023    Patient discharged home.  The patient did not have any home needs.  Family provided transportation home.        Final Discharge Note (most recent)       Final Note - 03/06/23 0914          Final Note    Assessment Type Final Discharge Note     Anticipated Discharge Disposition Home or Self Care        Post-Acute Status    Post-Acute Authorization Other     Other Status No Post-Acute Service Needs     Discharge Delays None known at this time                     Important Message from Medicare

## 2023-03-07 NOTE — PROCEDURES
VIDEO ELECTROENCEPHALOGRAM  REPORT      METHODOLOGY   Electroencephalographic (EEG) recording is with electrodes placed according to the International 10-20 placement system.  Thirty two (32) channels of digital signal (sampling rate of 512/sec) including T1 and T2 was simultaneously recorded from the scalp and may include  EKG, EMG, and/or eye monitors.  Recording band pass was 0.1 to 512 hz.  Digital video recording of the patient is simultaneously recorded with the EEG.  The patient is instructed report clinical symptoms which may occur during the recording session.  EEG and video recording is stored and archived in digital format.  Activation procedures which include photic stimulation, hyperventilation and instructing patients to perform simple task are done in selected patients.   The EEG is displayed on a monitor screen and can be reviewed using different montages.  Computer assisted analysis is employed to detect spike and electrographic seizure activity.   The entire record is submitted for computer analysis.  The entire recording is visually reviewed and the times identified by computer analysis as being spikes or seizures are reviewed again.  Compresses spectral analysis (CSA) is also performed on the activity recorded from each individual channel.  This is displayed as a power display of frequencies from 0 to 30 Hz over time.   The CSA is reviewed looking for asymmetries in power between homologous areas of the scalp and then compared with the original EEG recording.     [employer] software was also utilized in the review of this study.  This software suite analyzes the EEG recording in multiple domains.  Coherence and rhythmicity is computed to identify EEG sections which may contain organized seizures.  Each channel undergoes analysis to detect presence of spike and sharp waves which have special and morphological characteristic of epileptic activity.  The routine EEG recording is converted from spacial  into frequency domain.  This is then displayed comparing homologous areas to identify areas of significant asymmetry.  Algorithm to identify non-cortically generated artifact is used to separate eye movement, EMG and other artifact from the EEG.      ELECTROENCEPHALOGRAM:    DAY 3:  RECORDING TIMES:  3/5/23 at 07:00:10  3/5/23 at 14:13:38    A total of 7 hours and 12 minutes was recorded.    Indication: 27 year old female with seizure like events admitted for spell characterization.  As an outpatient she is on oxcarbazepine, which has been held.     State of Consciousness:   Awake and asleep    Background:   The background is well organized, symmetric and continuous.  There is a normal anterior to posterior gradient consisting of 5-10 mcV amplitude beta activity in the frontal region and well defined alpha activity in the posterior region.   There is a well developed 30-70 uV, 9-10 Hz posterior dominant rhythm that is symmetric and reactive to eye opening and closure         Sleep:   The patient reaches stage 2 sleep with symmetric vertex waves, sleep spindles, and k complexes noted.     Non-epileptiform Abnormalities:  None    Epileptiform Abnormalities:   None      EKG:   Regular rate and rhythm    Activating procedures:   - not performed    Events:   None      Impression:   Normal awake and sleep EEG.    Clinical interpretation:  This is a normal awake and sleep EEG.  No clinical events are recorded.  No epileptiform discharges or lateralizing features are noted.     Marialuisa Heath MD  [facility]   Department of Neurology

## 2023-07-11 ENCOUNTER — PATIENT MESSAGE (OUTPATIENT)
Dept: RESEARCH | Facility: HOSPITAL | Age: 28
End: 2023-07-11
Payer: MEDICAID